# Patient Record
Sex: MALE | Race: WHITE | NOT HISPANIC OR LATINO | Employment: FULL TIME | URBAN - METROPOLITAN AREA
[De-identification: names, ages, dates, MRNs, and addresses within clinical notes are randomized per-mention and may not be internally consistent; named-entity substitution may affect disease eponyms.]

---

## 2019-04-16 ENCOUNTER — DOCUMENTATION (OUTPATIENT)
Dept: CARDIOLOGY | Facility: CLINIC | Age: 59
End: 2019-04-16

## 2019-04-16 VITALS — HEIGHT: 64 IN

## 2019-04-17 ENCOUNTER — OFFICE VISIT (OUTPATIENT)
Dept: CARDIOLOGY | Facility: CLINIC | Age: 59
End: 2019-04-17

## 2019-04-17 VITALS
HEART RATE: 77 BPM | OXYGEN SATURATION: 98 % | WEIGHT: 211.6 LBS | DIASTOLIC BLOOD PRESSURE: 90 MMHG | SYSTOLIC BLOOD PRESSURE: 134 MMHG | HEIGHT: 64 IN | BODY MASS INDEX: 36.12 KG/M2

## 2019-04-17 DIAGNOSIS — R00.2 PALPITATIONS: Primary | ICD-10-CM

## 2019-04-17 PROCEDURE — 99204 OFFICE O/P NEW MOD 45 MIN: CPT | Performed by: INTERNAL MEDICINE

## 2019-04-17 RX ORDER — ALLOPURINOL 300 MG/1
TABLET ORAL
COMMUNITY
Start: 2019-03-15

## 2019-04-17 NOTE — PROGRESS NOTES
Subjective:     Encounter Date:04/17/2019      Patient ID: Silver Palacios is a 58 y.o. male.    Chief Complaint:  Chest Pain    This is a chronic problem. The problem occurs intermittently. The pain is present in the epigastric region. The pain does not radiate. The pain is aggravated by food.       58-year-old gentleman who presents today for evaluation.  Patient has a history of mitral valve prolapse that was diagnosed about 30 years ago.  He is also been having intermittent episodes of palpitations.  He underwent a Holter monitor recently at [address].  Patient describes intermittent episode of epigastric discomfort that he says is more associated with food than anything else.  He also has a history of steatosis.  Patient says after about 20 minutes on a treadmill he will get short of breath.  Occasionally has epigastric discomfort but this is not consistent and is more associated with eating.  He also has a history of a hiatal hernia.  He was referred for further evaluation after his ECG was not completely normal and he was having both atypical discomfort as well as palpitations.    Review of Systems   Cardiovascular: Positive for chest pain.   Psychiatric/Behavioral: The patient is nervous/anxious.    All other systems reviewed and are negative.      Procedures       Objective:     Physical Exam   Constitutional: He is oriented to person, place, and time. He appears well-developed.   HENT:   Head: Normocephalic.   Eyes: Conjunctivae are normal.   Neck: Normal range of motion.   Cardiovascular: Normal rate, regular rhythm and normal heart sounds.   Pulmonary/Chest: Breath sounds normal.   Abdominal: Soft. Bowel sounds are normal.   Musculoskeletal: Normal range of motion. He exhibits no edema.   Neurological: He is alert and oriented to person, place, and time.   Skin: Skin is warm and dry.   Psychiatric: He has a normal mood and affect. His behavior is normal.   Vitals reviewed.      Lab  Review:       Assessment:          Diagnosis Plan   1. Palpitations  Adult Transthoracic Echo Complete W/ Cont if Necessary Per Protocol    Treadmill Stress Test          Plan:       1.  Palpitations patient recently had a Holter monitor [place] [facility].  I currently do not have that at the time of dictation but will want it down and review it.  Patient was told it was unremarkable.  2.  Atypical chest discomfort.  I have rested up for an ordinary treadmill to rule out ischemia.  3.  Patient was told that he had mitral valve prolapse 30 years ago.  He has not been taking antibiotics now for many years at his dentist's recommendation which I agree with.  I am concerned however that he may not even have mitral valve prolapse.  With the palpitations and the fact he has not had an echo in many many years I am going to repeat his echocardiogram.  4.  If his echo and treadmill are unremarkable I would have him come back on an as-needed basis.

## 2022-09-27 ENCOUNTER — TRANSCRIBE ORDERS (OUTPATIENT)
Dept: OCCUPATIONAL THERAPY | Facility: CLINIC | Age: 62
End: 2022-09-27

## 2022-09-27 DIAGNOSIS — S39.012D STRAIN OF LUMBAR REGION, SUBSEQUENT ENCOUNTER: Primary | ICD-10-CM

## 2024-02-28 ENCOUNTER — TELEPHONE (OUTPATIENT)
Dept: CARDIOLOGY | Facility: CLINIC | Age: 64
End: 2024-02-28
Payer: COMMERCIAL

## 2024-02-28 NOTE — TELEPHONE ENCOUNTER
02/28/2024: PAPER REFERRAL RECEIVED // PRV CARD BH // LVM to give us call back to get referral schedule      [facility] -- PT OKAY TO BE SCHEDULED FOR FIRST AVAILABLE NEW PATIENT APPOINTMENT WITH ANY DR. EXCEPT: MIMA CARVAJAL, ROYCE, OR YESSENIA @ ANY [facility] [place] OFFICE//ACN